# Patient Record
Sex: FEMALE | Race: BLACK OR AFRICAN AMERICAN | Employment: FULL TIME | URBAN - METROPOLITAN AREA
[De-identification: names, ages, dates, MRNs, and addresses within clinical notes are randomized per-mention and may not be internally consistent; named-entity substitution may affect disease eponyms.]

---

## 2020-12-01 ENCOUNTER — OFFICE VISIT (OUTPATIENT)
Dept: OBGYN CLINIC | Age: 55
End: 2020-12-01
Payer: COMMERCIAL

## 2020-12-01 VITALS
WEIGHT: 252 LBS | HEART RATE: 88 BPM | BODY MASS INDEX: 38.19 KG/M2 | SYSTOLIC BLOOD PRESSURE: 153 MMHG | HEIGHT: 68 IN | DIASTOLIC BLOOD PRESSURE: 90 MMHG

## 2020-12-01 DIAGNOSIS — Z12.39 ENCOUNTER FOR SCREENING FOR MALIGNANT NEOPLASM OF BREAST, UNSPECIFIED SCREENING MODALITY: ICD-10-CM

## 2020-12-01 DIAGNOSIS — Z11.3 SCREENING FOR VENEREAL DISEASE: ICD-10-CM

## 2020-12-01 DIAGNOSIS — D21.9 FIBROIDS: ICD-10-CM

## 2020-12-01 DIAGNOSIS — N92.4 ABNORMAL PERIMENOPAUSAL BLEEDING: ICD-10-CM

## 2020-12-01 DIAGNOSIS — Z01.411 ENCOUNTER FOR GYNECOLOGICAL EXAMINATION WITH ABNORMAL FINDING: Primary | ICD-10-CM

## 2020-12-01 DIAGNOSIS — Z12.4 SCREENING FOR MALIGNANT NEOPLASM OF THE CERVIX: ICD-10-CM

## 2020-12-01 DIAGNOSIS — E66.01 SEVERE OBESITY (HCC): ICD-10-CM

## 2020-12-01 PROCEDURE — 99386 PREV VISIT NEW AGE 40-64: CPT | Performed by: OBSTETRICS & GYNECOLOGY

## 2020-12-01 RX ORDER — METFORMIN HYDROCHLORIDE 500 MG/1
TABLET ORAL
COMMUNITY
Start: 2020-10-27

## 2020-12-01 RX ORDER — LOSARTAN POTASSIUM 25 MG/1
TABLET ORAL DAILY
COMMUNITY

## 2020-12-01 NOTE — PROGRESS NOTES
45-year-old black female  0 last menstrual period November 15, 2020, 20 her history of oral contraceptive use, medical history notable for hypertension, currently on losartan and hydrochlorothiazide, diabetes treated with Metformin, who presents for annual gynecologic exam.    Patient primary concern is that of contraception. She also voices concern about the fact that she is having some intermenstrual spotting, but remarks that she is also having monthly cycles. She denies vaginal discharge itching or odor. Patient is sexually active with a partner since 2019. He lives in [place] also I do not get together that often. Last sexual encounter was November 15, 2020 and she denies pain or bleeding with intercourse. Last Pap smear within normal limits by history  Last mammogram 2019within normal limits by history  Last colonoscopynever    Patient works as a  for [employer]  Patient is a non-smoker  Reports occasional alcohol consumption    Patient performs monthly self breast examination  Patient consumes a daily multivitamin and cinnamon  Patient does not exercise regularlyshe was encouraged to do so      Past Medical History:   Diagnosis Date    Diabetes ([address]     Hypertension      History reviewed. No pertinent surgical history.    Social History     Socioeconomic History    Marital status: SINGLE     Spouse name: Not on file    Number of children: Not on file    Years of education: Not on file    Highest education level: Not on file   Tobacco Use    Smoking status: Never Smoker    Smokeless tobacco: Never Used   Substance and Sexual Activity    Alcohol use: Yes     Frequency: Monthly or less    Drug use: Never    Sexual activity: Yes      Family History   Problem Relation Age of Onset    Hypertension Mother     Diabetes Father     Hypertension Father       Current Outpatient Medications on File Prior to Visit   Medication Sig Dispense Refill  losartan (COZAAR) 25 mg tablet Take  by mouth daily.  metFORMIN (GLUCOPHAGE) 500 mg tablet TAKE 1 TABLET BY MOUTH TWICE DAILY WITH MEALS       No current facility-administered medications on file prior to visit. Allergies as of 12/01/2020 - Review Complete 12/01/2020   Allergen Reaction Noted    Penicillins Hives 12/01/2020       Constitutional:   Chaperone: accepted and present. General Appearance: healthy-appearing, well-nourished, and well-developed; obese black female. Level of Distress: NAD. Ambulation: ambulating normally. Psychiatric:   Insight: good judgement. Mental Status: normal mood and affect and active and alert. Orientation: to time, place, and person. Memory: recent memory normal and remote memory normal.     Head: Head: normocephalic and atraumatic. Neck:   Neck: supple, FROM, trachea midline, and no masses. Lymph Nodes: no cervical LAD, supraclavicular LAD, axillary LAD, or inguinal LAD. Thyroid: no enlargement or nodules and non-tender. Lungs:   Respiratory effort: no dyspnea. Auscultation: no wheezing, rales/crackles, or rhonchi and breath sounds normal, good air movement, and CTA except as noted. Cardiovascular:   Heart Auscultation: normal S1 and S2; no murmurs, rubs, or gallops; and RRR. Pulses including femoral / pedal: normal throughout. Breast: Breast: no masses or abnormal secretions and normal appearance. Abdomen: Bowel Sounds: normal. Inspection and Palpation: no tenderness, guarding, masses, rebound tenderness, or CVA tenderness and non-distended. Liver: non-tender and no hepatomegaly. Spleen: non-tender and no splenomegaly. Hernia: none palpable. Incisions none    Female :   External genitalia: no lesions or rash and normal.   Vagina: moist mucosa; brown discharge.    Cervix: no discharge, inflammation, or cervical motion tenderness   Uterus: midline, smooth, and non-tender; 10-week size   Adnexae: no adnexal mass or tenderness and size WNL. Bladder and Urethra: normal bladder and urethra (except where noted). Musculoskeletal[de-identified]   Motor Strength and Tone: normal tone and motor strength. Joints, Bones, and Muscles: no contractures, malalignment, tenderness, or bony abnormalities and normal movement of all extremities. Extremities: no cyanosis, edema, varicosities, or palpable cord. Neurologic:   Gait and Station: normal gait and station. Sensation: grossly intact. Reflexes: DTRs 2+ bilaterally throughout. Skin:   Inspection and palpation: no rash, lesions, ulcer, induration, nodules, jaundice, or abnormal nevi and good turgor. Nails: normal.     Back: Thoracolumbar Appearance: normal curvature. ICD-10-CM ICD-9-CM    1. Encounter for gynecological examination with abnormal finding  Z01.411 V72.31    2. Screening for malignant neoplasm of the cervix  Z12.4 V76.2 PAP IG, CT-NG-TV, APTIMA HPV AND RFX 62/81,62([id number])   3. Screening for venereal disease  Z11.3 V74.5 PAP IG, CT-NG-TV, APTIMA HPV AND RFX 97/09,78([id number])   4. Fibroids  D21.9 215.9 US PELV NON OBS      US TRANSVAGINAL   5. Abnormal perimenopausal bleeding  N92.4 627.0 US PELV NON OBS      US TRANSVAGINAL   6. Encounter for screening for malignant neoplasm of breast, unspecified screening modality  Z12.39 V76.10 CATHERINE 3D LYNDSEY W MAMMO BI SCREENING INCL CAD     Obese black female with perimenopausal bleeding. Exam suggestive of fibroids.   Will obtain ultrasound to evaluate endometrium; consider for hysteroscopy D&C  Check Pap smear, STD screen  Needs mammogram  Encourage self breast/examination, vitamin D consumption, exercise  Follow-up in 2 weeks  Also may require GI consultation for screening colonoscopy

## 2020-12-08 LAB
C TRACH RRNA CVX QL NAA+PROBE: NEGATIVE
CYTOLOGIST CVX/VAG CYTO: NORMAL
CYTOLOGY CVX/VAG DOC CYTO: NORMAL
CYTOLOGY CVX/VAG DOC THIN PREP: NORMAL
HPV I/H RISK 4 DNA CVX QL PROBE+SIG AMP: NEGATIVE
Lab: NORMAL
N GONORRHOEA RRNA CVX QL NAA+PROBE: NEGATIVE
OTHER STN SPEC: NORMAL
STAT OF ADQ CVX/VAG CYTO-IMP: NORMAL
T VAGINALIS RRNA SPEC QL NAA+PROBE: NEGATIVE

## 2020-12-16 ENCOUNTER — HOSPITAL ENCOUNTER (OUTPATIENT)
Dept: MAMMOGRAPHY | Age: 55
Discharge: HOME | End: 2020-12-16
Attending: OBSTETRICS & GYNECOLOGY
Payer: COMMERCIAL

## 2020-12-16 ENCOUNTER — APPOINTMENT (OUTPATIENT)
Dept: ULTRASOUND IMAGING | Age: 55
End: 2020-12-16
Attending: OBSTETRICS & GYNECOLOGY
Payer: COMMERCIAL

## 2020-12-16 DIAGNOSIS — D21.9 FIBROIDS: ICD-10-CM

## 2020-12-16 DIAGNOSIS — N92.4 ABNORMAL PERIMENOPAUSAL BLEEDING: ICD-10-CM

## 2020-12-16 DIAGNOSIS — Z12.39 ENCOUNTER FOR SCREENING FOR MALIGNANT NEOPLASM OF BREAST, UNSPECIFIED SCREENING MODALITY: ICD-10-CM

## 2020-12-16 PROCEDURE — 77063 BREAST TOMOSYNTHESIS BI: CPT

## 2021-01-11 ENCOUNTER — TELEPHONE (OUTPATIENT)
Dept: OBGYN CLINIC | Age: 56
End: 2021-01-11

## 2021-01-13 ENCOUNTER — HOSPITAL ENCOUNTER (OUTPATIENT)
Dept: ULTRASOUND IMAGING | Age: 56
Discharge: HOME | End: 2021-01-13
Attending: OBSTETRICS & GYNECOLOGY
Payer: COMMERCIAL

## 2021-01-13 PROCEDURE — 76830 TRANSVAGINAL US NON-OB: CPT

## 2021-01-13 PROCEDURE — 76856 US EXAM PELVIC COMPLETE: CPT

## 2021-01-18 NOTE — PROGRESS NOTES
Mailed patient letter to contact office to schedule follow-up appt to discuss the results of her ultrasound.

## 2021-01-25 ENCOUNTER — OFFICE VISIT (OUTPATIENT)
Dept: OBGYN CLINIC | Age: 56
End: 2021-01-25
Payer: COMMERCIAL

## 2021-01-25 VITALS
BODY MASS INDEX: 38.34 KG/M2 | HEIGHT: 68 IN | DIASTOLIC BLOOD PRESSURE: 77 MMHG | SYSTOLIC BLOOD PRESSURE: 158 MMHG | WEIGHT: 253 LBS

## 2021-01-25 DIAGNOSIS — E66.01 SEVERE OBESITY (HCC): ICD-10-CM

## 2021-01-25 DIAGNOSIS — N92.1 MENORRHAGIA WITH IRREGULAR CYCLE: Primary | ICD-10-CM

## 2021-01-25 DIAGNOSIS — D21.9 FIBROIDS: ICD-10-CM

## 2021-01-25 PROCEDURE — 99213 OFFICE O/P EST LOW 20 MIN: CPT | Performed by: OBSTETRICS & GYNECOLOGY

## 2021-01-26 NOTE — PROGRESS NOTES
50 y.o. black female  0 last menstrual period 2020, 20 year history of oral contraceptive use, medical history notable for hypertension, currently on losartan and hydrochlorothiazide, diabetes treated with Metformin, seen for annual gynecologic exam 2020, who presents for results and follow-up. .    At the time of her annual exam, patient described intermenstrual spotting, and has since undergone ultrasound evaluation. See results below. She otherwise denies pelvic pain vaginal discharge itching or odor. Patient is sexually active with a partner since 2019. He lives in [place] also I do not get together that often. Last sexual encounter was  and she denies pain or bleeding with intercourse. Last Pap smear 2020negative  Last mammogram 2020BI-RADS Category 1  Last colonoscopynever    Patient works as a  for [employer]  Patient is a non-smoker  Reports occasional alcohol consumption    Patient performs monthly self breast examination  Patient consumes a daily multivitamin and cinnamon  Patient does not exercise regularlyshe was encouraged to do so      Past Medical History:   Diagnosis Date    Diabetes ([facility] [address]     Hypertension      History reviewed. No pertinent surgical history.    Social History     Socioeconomic History    Marital status: SINGLE     Spouse name: Not on file    Number of children: Not on file    Years of education: Not on file    Highest education level: Not on file   Tobacco Use    Smoking status: Never Smoker    Smokeless tobacco: Never Used   Substance and Sexual Activity    Alcohol use: Yes     Frequency: Monthly or less    Drug use: Never    Sexual activity: Yes      Family History   Problem Relation Age of Onset    Hypertension Mother     Diabetes Father     Hypertension Father       Current Outpatient Medications on File Prior to Visit   Medication Sig Dispense Refill    metFORMIN (GLUCOPHAGE) 500 mg tablet TAKE 1 TABLET BY MOUTH TWICE DAILY WITH MEALS      losartan (COZAAR) 25 mg tablet Take  by mouth daily. No current facility-administered medications on file prior to visit. Allergies as of 01/25/2021 - Review Complete 01/25/2021   Allergen Reaction Noted    Penicillins Hives 12/01/2020       Constitutional:   Chaperone: accepted and present. General Appearance: healthy-appearing, well-nourished, and well-developed; obese black female. Level of Distress: NAD. Ambulation: ambulating normally. Psychiatric:   Insight: good judgement. Mental Status: normal mood and affect and active and alert. Orientation: to time, place, and person. Memory: recent memory normal and remote memory normal.     Head: Head: normocephalic and atraumatic. ICD-10-CM ICD-9-CM    1. Menorrhagia with irregular cycle  N92.1 626.2    2. Severe obesity ([address]  E66.01 278.01    3. Fibroids  D21.9 215.9      Menorrhagia and fibroids  Could not delineate endometrial thickness on ultrasound  Alternatives include endometrial biopsy hysteroscopy D&C. At this point patient voices a desire for hysterectomy, risks and benefits of each alternatives discussed. Specific risks of surgery discussed included bleeding, transfusions, infections, visceral injuries to bowel, bladder, vascular structures. Postoperative recovery discussed. Logistics and rationale bowel prep discussed. Patient advised to consider options, and let us know how she would like us to proceed, but will need endometrial sampling prior to hysterectomy. Total visit time was 20 minutes; 15 minutes were spent face-to-face and greater than 50% of the time was spent counseling the patient.

## 2021-03-01 ENCOUNTER — OFFICE VISIT (OUTPATIENT)
Dept: OBGYN CLINIC | Age: 56
End: 2021-03-01
Payer: COMMERCIAL

## 2021-03-01 VITALS
HEIGHT: 68 IN | BODY MASS INDEX: 38.04 KG/M2 | DIASTOLIC BLOOD PRESSURE: 66 MMHG | WEIGHT: 251 LBS | SYSTOLIC BLOOD PRESSURE: 118 MMHG

## 2021-03-01 DIAGNOSIS — D21.9 FIBROIDS: ICD-10-CM

## 2021-03-01 DIAGNOSIS — N80.9 ENDOMETRIOSIS: ICD-10-CM

## 2021-03-01 DIAGNOSIS — N92.0 MENORRHAGIA WITH REGULAR CYCLE: Primary | ICD-10-CM

## 2021-03-01 PROCEDURE — 99214 OFFICE O/P EST MOD 30 MIN: CPT | Performed by: OBSTETRICS & GYNECOLOGY

## 2021-03-01 PROCEDURE — 58100 BIOPSY OF UTERUS LINING: CPT | Performed by: OBSTETRICS & GYNECOLOGY

## 2021-03-01 NOTE — PROGRESS NOTES
Nesha Florez is a  50 y.o. female BLACK  LMP 2021, 20 year history of oral contraceptive use, medical history notable for hypertension, currently on losartan and hydrochlorothiazide, diabetes treated with Metformin, seen for annual gynecologic exam 2020, who presents for endometrial biopsy, and follow-up. Patient with menorrhagia, and fibroids, recently underwent ultrasound examination where the fibroids obscured the endometrial lining. She presents today for endometrial biopsy in preparation for hysterectomy. Patient currently denies vaginal discharge itching or odor. Patient is sexually active with a partner since 2019. He lives in [place] also I do not get together that often. Last sexual encounter was yesterday and she denies pain or bleeding with intercourse. Last Pap smear 2020negative; Last mammogram 2020BI-RADS Category 1  Last colonoscopynever    Patient works as a  for [employer]  Patient is a non-smoker  Reports occasional alcohol consumption    Past Medical History:   Diagnosis Date    Diabetes ([address]     Hypertension      History reviewed. No pertinent surgical history.    Social History     Socioeconomic History    Marital status: SINGLE     Spouse name: Not on file    Number of children: Not on file    Years of education: Not on file    Highest education level: Not on file   Tobacco Use    Smoking status: Never Smoker    Smokeless tobacco: Never Used   Substance and Sexual Activity    Alcohol use: Yes     Frequency: Monthly or less    Drug use: Never    Sexual activity: Yes      Family History   Problem Relation Age of Onset    Hypertension Mother     Diabetes Father     Hypertension Father       Current Outpatient Medications on File Prior to Visit   Medication Sig Dispense Refill    metFORMIN (GLUCOPHAGE) 500 mg tablet TAKE 1 TABLET BY MOUTH TWICE DAILY WITH MEALS      losartan (COZAAR) 25 mg tablet Take  by mouth daily. No current facility-administered medications on file prior to visit. Allergies as of 03/01/2021 - Review Complete 03/01/2021   Allergen Reaction Noted    Penicillins Hives 12/01/2020       Constitutional:   Chaperone: accepted and present. General Appearance: healthy-appearing, well-nourished, and well-developed; obese black female. Level of Distress: NAD. Ambulation: ambulating normally. Psychiatric:   Insight: good judgement. Mental Status: normal mood and affect and active and alert. Orientation: to time, place, and person. Memory: recent memory normal and remote memory normal.     Head: Head: normocephalic and atraumatic. Lungs:   Respiratory effort: no dyspnea. Auscultation: no wheezing, rales/crackles, or rhonchi and breath sounds normal, good air movement, and CTA except as noted. Cardiovascular:   Heart Auscultation: normal S1 and S2; no murmurs, rubs, or gallops; and RRR. Pulses including femoral / pedal: normal throughout. Abdomen: Bowel Sounds: normal. Inspection and Palpation: no tenderness, guarding, masses, rebound tenderness, or CVA tenderness and non-distended. Liver: non-tender and no hepatomegaly. Spleen: non-tender and no splenomegaly. Hernia: none palpable. Incisions none    Female :   External genitalia: no lesions or rash and normal.   Vagina: moist mucosa; scant discharge. Cervix: no discharge, inflammation, or cervical motion tenderness     Musculoskeletal[de-identified]   Motor Strength and Tone: normal tone and motor strength. Joints, Bones, and Muscles: no contractures, malalignment, tenderness, or bony abnormalities and normal movement of all extremities. Extremities: no cyanosis, edema, varicosities, or palpable cord. Neurologic:   Gait and Station: normal gait and station. Sensation: grossly intact. Reflexes: DTRs 2+ bilaterally throughout.      Skin:   Inspection and palpation: no rash, lesions, ulcer, induration, nodules, jaundice, or abnormal nevi and good turgor. Nails: normal.           ICD-10-CM ICD-9-CM    1. Endometriosis  N80.9 617.9 SURGICAL PATHOLOGY   2. Menorrhagia with regular cycle  N92.0 626.2 BIOPSY OF UTERUS LINING   3. Fibroids  D21.9 215.9 BIOPSY OF UTERUS LINING     Menorrhagia and fibroids  Alternatives include endometrial biopsy hysteroscopy D&C. At this point patient voices a desire for hysterectomy, risks and benefits of each alternatives discussed. Specific risks of surgery discussed included bleeding, transfusions, infections, visceral injuries to bowel, bladder, vascular structures. Postoperative recovery discussed. Logistics and rationale bowel prep discussed. Patient was given an opportunity to ask questions, all questions were answered, patient voiced understanding of above.     We will plan for TLH, bilateral salpingectomy

## 2021-03-04 LAB
CPT CODES, 490044: NORMAL
CPT DISCLAIMER: NORMAL
CYTOLOGY SPEC DOC CYTO: NORMAL
DIAGNOSIS SYNOPSIS:: NORMAL
DX ICD CODE: NORMAL
PATH REPORT.GROSS SPEC: NORMAL
PATH REPORT.RELEVANT HX SPEC: NORMAL
PATHOLOGIST NAME: NORMAL
PDF IMAGE, 807507: NORMAL
SPECIMEN SOURCE: NORMAL

## 2021-04-02 ENCOUNTER — TELEPHONE (OUTPATIENT)
Dept: OBGYN CLINIC | Age: 56
End: 2021-04-02

## 2021-04-02 NOTE — TELEPHONE ENCOUNTER
Called pt and she said she had not heard anything from patient accounts. Email sent to Arnold Gonzalez with phone number to reach pt.

## 2021-04-28 ENCOUNTER — TELEPHONE (OUTPATIENT)
Dept: OBGYN CLINIC | Age: 56
End: 2021-04-28

## 2021-04-28 RX ORDER — MEDROXYPROGESTERONE ACETATE 10 MG/1
10 TABLET ORAL DAILY
Qty: 30 TAB | Refills: 4 | Status: SHIPPED | OUTPATIENT
Start: 2021-04-28 | End: 2021-05-28

## 2021-04-28 NOTE — TELEPHONE ENCOUNTER
Pt called and started she started her period, has been bleeding for about 2 wks, can't afford to have surgery at this time. Spoke with Dr Niko Diaz will send Provera.

## 2022-03-19 PROBLEM — E66.01 SEVERE OBESITY (HCC): Status: ACTIVE | Noted: 2020-12-01

## 2023-05-14 RX ORDER — LOSARTAN POTASSIUM 25 MG/1
TABLET ORAL DAILY
COMMUNITY